# Patient Record
Sex: MALE | Race: WHITE | NOT HISPANIC OR LATINO | Employment: UNEMPLOYED | URBAN - METROPOLITAN AREA
[De-identification: names, ages, dates, MRNs, and addresses within clinical notes are randomized per-mention and may not be internally consistent; named-entity substitution may affect disease eponyms.]

---

## 2021-08-11 ENCOUNTER — HISTORICAL (OUTPATIENT)
Dept: LAB | Facility: HOSPITAL | Age: 61
End: 2021-08-11

## 2021-08-11 LAB
ALBUMIN SERPL-MCNC: 3.4 GM/DL (ref 3.5–5)
ALP SERPL-CCNC: 79 UNIT/L (ref 40–150)
ALT SERPL-CCNC: 9 UNIT/L (ref 0–55)
AST SERPL-CCNC: 14 UNIT/L (ref 5–34)
BILIRUB SERPL-MCNC: 0.4 MG/DL
BILIRUBIN DIRECT+TOT PNL SERPL-MCNC: 0.2 MG/DL (ref 0–0.5)
BILIRUBIN DIRECT+TOT PNL SERPL-MCNC: 0.2 MG/DL (ref 0–0.8)
BUN SERPL-MCNC: 15.5 MG/DL (ref 8.4–25.7)
CALCIUM SERPL-MCNC: 9.4 MG/DL (ref 8.4–10.2)
CHLORIDE SERPL-SCNC: 102 MMOL/L (ref 98–107)
CHOLEST SERPL-MCNC: 189 MG/DL
CHOLEST/HDLC SERPL: 4 {RATIO} (ref 0–5)
CO2 SERPL-SCNC: 30 MMOL/L (ref 22–29)
CREAT SERPL-MCNC: 0.85 MG/DL (ref 0.73–1.18)
CREAT/UREA NIT SERPL: 18
ERYTHROCYTE [DISTWIDTH] IN BLOOD BY AUTOMATED COUNT: 15.4 % (ref 11.5–17)
FT4I SERPL CALC-MCNC: 2.6 (ref 2.6–3.6)
GLUCOSE SERPL-MCNC: 111 MG/DL (ref 74–100)
HCT VFR BLD AUTO: 44.5 % (ref 42–52)
HDLC SERPL-MCNC: 48 MG/DL (ref 35–60)
HGB BLD-MCNC: 13 GM/DL (ref 14–18)
LDLC SERPL CALC-MCNC: 119 MG/DL (ref 50–140)
MCH RBC QN AUTO: 22 PG (ref 27–31)
MCHC RBC AUTO-ENTMCNC: 29.2 GM/DL (ref 33–36)
MCV RBC AUTO: 75.2 FL (ref 80–94)
PLATELET # BLD AUTO: 244 X10(3)/MCL (ref 130–400)
PMV BLD AUTO: 8.9 FL (ref 9.4–12.4)
POTASSIUM SERPL-SCNC: 4.6 MMOL/L (ref 3.5–5.1)
PROT SERPL-MCNC: 7.2 GM/DL (ref 6.4–8.3)
PSA SERPL-MCNC: 0.11 NG/ML
RBC # BLD AUTO: 5.92 X10(6)/MCL (ref 4.7–6.1)
SODIUM SERPL-SCNC: 140 MMOL/L (ref 136–145)
T3RU NFR SERPL: 31.9 % (ref 31–39)
T4 SERPL-MCNC: 8.16 UG/DL (ref 4.87–11.72)
TRIGL SERPL-MCNC: 108 MG/DL (ref 34–140)
TSH SERPL-ACNC: 1.94 UIU/ML (ref 0.35–4.94)
VLDLC SERPL CALC-MCNC: 22 MG/DL
WBC # SPEC AUTO: 7.5 X10(3)/MCL (ref 4.5–11.5)

## 2022-10-21 ENCOUNTER — LAB VISIT (OUTPATIENT)
Dept: LAB | Facility: HOSPITAL | Age: 62
End: 2022-10-21
Attending: FAMILY MEDICINE
Payer: MEDICAID

## 2022-10-21 DIAGNOSIS — Z00.00 ROUTINE GENERAL MEDICAL EXAMINATION AT A HEALTH CARE FACILITY: Primary | ICD-10-CM

## 2022-10-21 DIAGNOSIS — I10 ESSENTIAL HYPERTENSION, MALIGNANT: ICD-10-CM

## 2022-10-21 DIAGNOSIS — J96.12 CHRONIC RESPIRATORY ACIDOSIS: ICD-10-CM

## 2022-10-21 LAB
ALBUMIN SERPL-MCNC: 3.2 GM/DL (ref 3.4–4.8)
ALP SERPL-CCNC: 85 UNIT/L (ref 40–150)
ALT SERPL-CCNC: 8 UNIT/L (ref 0–55)
ANION GAP SERPL CALC-SCNC: 7 MEQ/L
AST SERPL-CCNC: 12 UNIT/L (ref 5–34)
BASOPHILS # BLD AUTO: 0.04 X10(3)/MCL (ref 0–0.2)
BASOPHILS NFR BLD AUTO: 0.5 %
BILIRUBIN DIRECT+TOT PNL SERPL-MCNC: 0.2 MG/DL (ref 0–0.5)
BILIRUBIN DIRECT+TOT PNL SERPL-MCNC: 0.4 MG/DL (ref 0–0.8)
BILIRUBIN DIRECT+TOT PNL SERPL-MCNC: 0.6 MG/DL
BUN SERPL-MCNC: 12.6 MG/DL (ref 8.4–25.7)
CALCIUM SERPL-MCNC: 9.3 MG/DL (ref 8.8–10)
CHLORIDE SERPL-SCNC: 101 MMOL/L (ref 98–107)
CHOLEST SERPL-MCNC: 190 MG/DL
CHOLEST/HDLC SERPL: 4 {RATIO} (ref 0–5)
CO2 SERPL-SCNC: 30 MMOL/L (ref 23–31)
CREAT SERPL-MCNC: 0.73 MG/DL (ref 0.73–1.18)
CREAT/UREA NIT SERPL: 17
EOSINOPHIL # BLD AUTO: 0.1 X10(3)/MCL (ref 0–0.9)
EOSINOPHIL NFR BLD AUTO: 1.3 %
ERYTHROCYTE [DISTWIDTH] IN BLOOD BY AUTOMATED COUNT: 15.1 % (ref 11.5–17)
FT4I SERPL CALC-MCNC: 2.95 (ref 2.6–3.6)
GFR SERPLBLD CREATININE-BSD FMLA CKD-EPI: >60 MLS/MIN/1.73/M2
GLUCOSE SERPL-MCNC: 111 MG/DL (ref 82–115)
HCT VFR BLD AUTO: 43.4 % (ref 42–52)
HDLC SERPL-MCNC: 47 MG/DL (ref 35–60)
HGB BLD-MCNC: 12.7 GM/DL (ref 14–18)
IMM GRANULOCYTES # BLD AUTO: 0 X10(3)/MCL (ref 0–0.04)
IMM GRANULOCYTES NFR BLD AUTO: 0 %
LDLC SERPL CALC-MCNC: 122 MG/DL (ref 50–140)
LYMPHOCYTES # BLD AUTO: 1.52 X10(3)/MCL (ref 0.6–4.6)
LYMPHOCYTES NFR BLD AUTO: 19.3 %
MCH RBC QN AUTO: 22.2 PG (ref 27–31)
MCHC RBC AUTO-ENTMCNC: 29.3 MG/DL (ref 33–36)
MCV RBC AUTO: 75.9 FL (ref 80–94)
MONOCYTES # BLD AUTO: 0.7 X10(3)/MCL (ref 0.1–1.3)
MONOCYTES NFR BLD AUTO: 8.9 %
NEUTROPHILS # BLD AUTO: 5.5 X10(3)/MCL (ref 2.1–9.2)
NEUTROPHILS NFR BLD AUTO: 70 %
PLATELET # BLD AUTO: 256 X10(3)/MCL (ref 130–400)
PMV BLD AUTO: 8.9 FL (ref 7.4–10.4)
POTASSIUM SERPL-SCNC: 4.5 MMOL/L (ref 3.5–5.1)
PROT SERPL-MCNC: 7.1 GM/DL (ref 5.8–7.6)
PSA SERPL-MCNC: 0.17 NG/ML
RBC # BLD AUTO: 5.72 X10(6)/MCL (ref 4.7–6.1)
SODIUM SERPL-SCNC: 138 MMOL/L (ref 136–145)
T3RU NFR SERPL: 36.29 % (ref 31–39)
T4 SERPL-MCNC: 8.14 UG/DL (ref 4.87–11.72)
TRIGL SERPL-MCNC: 105 MG/DL (ref 34–140)
TSH SERPL-ACNC: 1.76 UIU/ML (ref 0.35–4.94)
VLDLC SERPL CALC-MCNC: 21 MG/DL
WBC # SPEC AUTO: 7.9 X10(3)/MCL (ref 4.5–11.5)

## 2022-10-21 PROCEDURE — 84436 ASSAY OF TOTAL THYROXINE: CPT

## 2022-10-21 PROCEDURE — 80061 LIPID PANEL: CPT

## 2022-10-21 PROCEDURE — 85025 COMPLETE CBC W/AUTO DIFF WBC: CPT

## 2022-10-21 PROCEDURE — 84153 ASSAY OF PSA TOTAL: CPT

## 2022-10-21 PROCEDURE — 36415 COLL VENOUS BLD VENIPUNCTURE: CPT

## 2022-10-21 PROCEDURE — 80076 HEPATIC FUNCTION PANEL: CPT

## 2022-10-21 PROCEDURE — 84443 ASSAY THYROID STIM HORMONE: CPT

## 2022-10-21 PROCEDURE — 84479 ASSAY OF THYROID (T3 OR T4): CPT

## 2022-10-21 PROCEDURE — 80048 BASIC METABOLIC PNL TOTAL CA: CPT

## 2024-03-29 ENCOUNTER — HOSPITAL ENCOUNTER (EMERGENCY)
Facility: HOSPITAL | Age: 64
Discharge: HOME | End: 2024-03-29
Attending: FAMILY MEDICINE
Payer: MEDICAID

## 2024-03-29 VITALS
BODY MASS INDEX: 40.43 KG/M2 | OXYGEN SATURATION: 96 % | HEIGHT: 74 IN | WEIGHT: 315 LBS | HEART RATE: 20 BPM | RESPIRATION RATE: 20 BRPM | SYSTOLIC BLOOD PRESSURE: 159 MMHG | TEMPERATURE: 98 F | DIASTOLIC BLOOD PRESSURE: 78 MMHG

## 2024-03-29 DIAGNOSIS — R21 RASH: Primary | ICD-10-CM

## 2024-03-29 PROCEDURE — 25000003 PHARM REV CODE 250: Performed by: FAMILY MEDICINE

## 2024-03-29 PROCEDURE — 99283 EMERGENCY DEPT VISIT LOW MDM: CPT

## 2024-03-29 RX ORDER — MUPIROCIN 20 MG/G
1 OINTMENT TOPICAL
Status: COMPLETED | OUTPATIENT
Start: 2024-03-29 | End: 2024-03-29

## 2024-03-29 RX ORDER — NYSTATIN 100000 [USP'U]/G
POWDER TOPICAL 2 TIMES DAILY
Qty: 60 G | Refills: 0 | Status: SHIPPED | OUTPATIENT
Start: 2024-03-29

## 2024-03-29 RX ADMIN — MUPIROCIN 1 TUBE: 20 OINTMENT TOPICAL at 02:03

## 2024-03-29 NOTE — DISCHARGE INSTRUCTIONS
Stopped the steroid cream.  Follow up with wound care next week.  Use medicines as instructed.  Recommend weight loss.

## 2024-03-29 NOTE — ED PROVIDER NOTES
Encounter Date: 3/29/2024       History     Chief Complaint   Patient presents with    Rash     Pt reports to rash to lower abd area . Pt states it is raw and bleeds at time after showers.      64-year-old morbidly obese white male presents with a chronic rash to his lower abdomen says it is rash has been there over a year with skin breakdown says they have been using steroid cream for over a year and would like to be get it evaluated vital signs are stable on exam has a morbid obese male large pannus lifting up the belly he has some skin tears in his areas of excoriation no signs of cellulitis very thin skin probably from overuse of the steroid cream instructed patient to stop using the steroid cream keep the area dry and clean use some ointment until he can follow up with the wound care center next week recommend weight loss possible meet with a surgeon for a stomach banding or medications to reduce the size of his abdomen I am afraid this is to be a chronic problem until he loses some weight explained all this to patient he understands we will follow up with wound care next week        Review of patient's allergies indicates:  No Known Allergies  History reviewed. No pertinent past medical history.  History reviewed. No pertinent surgical history.  History reviewed. No pertinent family history.     Review of Systems   Constitutional:  Negative for fever.   HENT:  Negative for sore throat.    Respiratory:  Negative for shortness of breath.    Cardiovascular:  Negative for chest pain.   Gastrointestinal:  Negative for nausea.   Genitourinary:  Negative for dysuria.   Musculoskeletal:  Negative for back pain.   Skin:  Positive for rash and wound.   Neurological:  Negative for weakness.   Hematological:  Does not bruise/bleed easily.   All other systems reviewed and are negative.      Physical Exam     Initial Vitals [03/29/24 1401]   BP Pulse Resp Temp SpO2   (!) 159/78 (!) 20 20 97.8 °F (36.6 °C) 96 %      MAP        --         Physical Exam    Nursing note and vitals reviewed.  Constitutional: He appears well-developed and well-nourished. He is active.   HENT:   Head: Normocephalic and atraumatic.   Eyes: Conjunctivae, EOM and lids are normal. Pupils are equal, round, and reactive to light.   Neck: Trachea normal and phonation normal. Neck supple. No thyroid mass present.   Normal range of motion.  Cardiovascular:  Normal rate, regular rhythm, normal heart sounds and normal pulses.           Pulmonary/Chest: Breath sounds normal.   Abdominal: Abdomen is soft. Bowel sounds are normal.   Musculoskeletal:      Cervical back: Normal range of motion and neck supple.     Neurological: He is alert and oriented to person, place, and time. He has normal strength and normal reflexes.   Skin: Skin is warm and intact. Rash noted. There is erythema.   Skin tear   Psychiatric: He has a normal mood and affect. His speech is normal and behavior is normal. Judgment and thought content normal. Cognition and memory are normal.         ED Course   Procedures  Labs Reviewed - No data to display       Imaging Results    None          Medications   mupirocin 2 % ointment 1 Tube (1 Tube Topical (Top) Given 3/29/24 1416)     Medical Decision Making  64-year-old morbidly obese white male presents with a chronic rash to his lower abdomen says it is rash has been there over a year with skin breakdown says they have been using steroid cream for over a year and would like to be get it evaluated vital signs are stable on exam has a morbid obese male large pannus lifting up the belly he has some skin tears in his areas of excoriation no signs of cellulitis very thin skin probably from overuse of the steroid cream instructed patient to stop using the steroid cream keep the area dry and clean use some ointment until he can follow up with the wound care center next week recommend weight loss possible meet with a surgeon for a stomach banding or medications to  reduce the size of his abdomen I am afraid this is to be a chronic problem until he loses some weight explained all this to patient he understands we will follow up with wound care next week          Risk  Prescription drug management.  Risk Details: Differential diagnosis bacterial infection versus fungal infection skin thinning from overuse of steroids skin tear                                      Clinical Impression:  Final diagnoses:  [R21] Rash (Primary)          ED Disposition Condition    Discharge Stable          ED Prescriptions       Medication Sig Dispense Start Date End Date Auth. Provider    nystatin (MYCOSTATIN) powder Apply topically 2 (two) times daily. 60 g 3/29/2024 -- Zeeshan Wynne MD          Follow-up Information       Follow up With Specialties Details Why Contact Info    Jake Moralez Jr., MD Family Medicine In 1 week  [address]  [facility] [address]  [phone]               Zeeshan Wynne MD  03/29/24 1419       Zeeshan Wynne MD  03/29/24 1419       Zeeshan Wynne MD  03/29/24 142

## 2024-03-29 NOTE — ED NOTES
Wound care clinic at [facility] called for referral, no answer but message was left. Informed of pt's current wound and information provided for follow-up

## 2024-04-04 ENCOUNTER — HOSPITAL ENCOUNTER (OUTPATIENT)
Dept: WOUND CARE | Facility: HOSPITAL | Age: 64
Discharge: HOME | End: 2024-04-04
Attending: FAMILY MEDICINE
Payer: COMMERCIAL

## 2024-04-04 VITALS
SYSTOLIC BLOOD PRESSURE: 153 MMHG | HEART RATE: 82 BPM | DIASTOLIC BLOOD PRESSURE: 80 MMHG | RESPIRATION RATE: 18 BRPM | TEMPERATURE: 99 F | OXYGEN SATURATION: 94 %

## 2024-04-04 DIAGNOSIS — B37.2 CANDIDIASIS, INTERTRIGO: Primary | ICD-10-CM

## 2024-04-04 DIAGNOSIS — L89.90 PRESSURE ULCERS OF SKIN OF MULTIPLE TOPOGRAPHIC SITES: ICD-10-CM

## 2024-04-04 DIAGNOSIS — L03.311 ABDOMINAL WALL CELLULITIS: ICD-10-CM

## 2024-04-04 DIAGNOSIS — E65 ABDOMINAL PANNUS: ICD-10-CM

## 2024-04-04 PROCEDURE — 99203 OFFICE O/P NEW LOW 30 MIN: CPT | Mod: ,,, | Performed by: FAMILY MEDICINE

## 2024-04-04 PROCEDURE — 99204 OFFICE O/P NEW MOD 45 MIN: CPT

## 2024-04-04 PROCEDURE — 87070 CULTURE OTHR SPECIMN AEROBIC: CPT

## 2024-04-04 PROCEDURE — 27000999 HC MEDICAL RECORD PHOTO DOCUMENTATION

## 2024-04-04 RX ORDER — METOPROLOL SUCCINATE 25 MG/1
25 TABLET, EXTENDED RELEASE ORAL DAILY
COMMUNITY

## 2024-04-04 RX ORDER — MELOXICAM 15 MG/1
15 TABLET ORAL
COMMUNITY
Start: 2024-03-11

## 2024-04-04 RX ORDER — TERBINAFINE HYDROCHLORIDE 250 MG/1
250 TABLET ORAL DAILY
Qty: 30 TABLET | Refills: 0 | Status: SHIPPED | OUTPATIENT
Start: 2024-04-04 | End: 2024-05-04

## 2024-04-04 RX ORDER — FINASTERIDE 5 MG/1
5 TABLET, FILM COATED ORAL DAILY
COMMUNITY

## 2024-04-04 RX ORDER — SILVER NITRATE 38.21; 12.74 MG/1; MG/1
STICK TOPICAL
Status: DISCONTINUED
Start: 2024-04-04 | End: 2024-04-05 | Stop reason: HOSPADM

## 2024-04-04 RX ORDER — TRIAMCINOLONE ACETONIDE 1 MG/G
CREAM TOPICAL 3 TIMES DAILY
COMMUNITY
Start: 2024-03-25

## 2024-04-04 RX ORDER — AMLODIPINE BESYLATE 10 MG/1
10 TABLET ORAL DAILY
COMMUNITY

## 2024-04-04 RX ORDER — MUPIROCIN 20 MG/G
OINTMENT TOPICAL 3 TIMES DAILY
COMMUNITY

## 2024-04-04 RX ORDER — SULFAMETHOXAZOLE AND TRIMETHOPRIM 800; 160 MG/1; MG/1
1 TABLET ORAL 2 TIMES DAILY
Qty: 30 TABLET | Refills: 0 | Status: SHIPPED | OUTPATIENT
Start: 2024-04-04 | End: 2024-04-19

## 2024-04-04 RX ORDER — FUROSEMIDE 20 MG/1
20 TABLET ORAL 2 TIMES DAILY
COMMUNITY

## 2024-04-04 RX ORDER — TAMSULOSIN HYDROCHLORIDE 0.4 MG/1
0.4 CAPSULE ORAL DAILY
COMMUNITY

## 2024-04-04 RX ORDER — KETOCONAZOLE 20 MG/G
CREAM TOPICAL DAILY
COMMUNITY

## 2024-04-04 NOTE — PROGRESS NOTES
[facility]      Subjective:     Patient seen in clinic today.  Dressing changed as ordered.      Assessment:          No diagnosis found.      Plan:   [unfilled]        No orders of the defined types were placed in this encounter.

## 2024-04-04 NOTE — PROGRESS NOTES
Subjective:       Patient ID: Tracy Hendrix is a 64 y.o. male.    Chief Complaint: Wound Consult (New patient.   Referred for Abdominal fold skin rash/irritation, ulcers to abdominal folds.  Reports he has had recurring issues for past several years and has been using steroid cream to area.   Seen at [facility] ED on 3/29/24.   Discontinued use of triamcinolone.  Currently using bactroban ointment and nystatin powder topically to area.  Here for evaluation and treatment with md. )    HPI  Review of Systems      Objective:      Temp:  [98.9 °F (37.2 °C)]   Pulse:  [82]   Resp:  [18]   BP: (153)/(80)   SpO2:  [94 %]   Physical Exam       Rash 04/04/24 1300 lower Abdomen (Active)   04/04/24 1300   Present Prior to Hospital Arrival?: Yes   Side:    Orientation: lower   Location: Abdomen   Type:    Additional Comments:    Removal Indications:    Distribution generalized 04/04/24 1441   Characteristics burning;pruritus/itching;draining/oozing;redness/erythema;edema;pain/discomfort 04/04/24 1441   Color deep red;pink;purple;red;tan 04/04/24 1441   Rash Care cleansed with;soap and water;antifungal applied;barrier applied;other (see comments) 04/04/24 1441         Assessment:     Pt examined and notes review above. The pt went to ER in  and he was referred here for evaluation and tx. He said he had problems for some time with infections and redness of the skin folds. The patient is severe obese and he have an abd pannus and redness of the abdominal area 2nd to cellulitis. The pt have multiple ulcers in the folds in the left side of the folds. Some areas of bruising in the adb folds in the left side. Intertrigo with candidiasis infection in the perineal area and abd folds. The pt said he doesn't have diabetes but his mom have a hx of DM. I recommend to do some blood work CBC/CMP/HGA1c/ culture of the wounds. The ulcers in the pannus area is probably related to pressure of the abd folds rubbing. Plans is to  "start Bactrim DS x 15 days for the cellulitis and lamisil 250mg qd x 30 days.Apply thin mixture of antifungal cream and Zguard BID. RTC in 1 week                                  ICD-10-CM ICD-9-CM   1. Candidiasis, intertrigo  B37.2 112.3   2. Pressure ulcers of skin of multiple topographic sites  L89.90 707.00     707.20   3. Abdominal pannus  E65 278.1   4. Abdominal wall cellulitis  L03.311 682.2         Plan:   Tissue pathology and/or culture taken:  [x] Yes [] No   Sharp debridement performed:   [] Yes [x] No   Labs ordered this visit:   [x] Yes [] No   Imaging ordered this visit:   [] Yes [x] No           Orders Placed This Encounter   Procedures    Wound Culture    CBC Auto Differential     Standing Status:   Future     Standing Expiration Date:   7/3/2025    Hemoglobin A1C     Standing Status:   Future     Standing Expiration Date:   7/3/2025    Comprehensive Metabolic Panel     Standing Status:   Future     Standing Expiration Date:   7/3/2025    Change dressing     Shower using gentle soap and water (Remington's and Remington's shampoo)  Dry all skin folds very well.      Apply thin mixture of Antifungal ointment mixed with Z guard paste to skin folds  Use soft absorbant dry sheets to folds ( may use soft disposable wash cloths, old cotton soft sheets or tshirt strips, chux pads etc)    Goal is to make sure to absorb excess moisture and have "sheets rubbing together / not skin rubbing together)    Apply mixture twice daily     antibiotic and antifungal oral medication and take as prescribed, antibiotics may be changed if needed when final culture results returned    Go to [facility] for outpatient labs        Follow up in about 1 week (around 4/11/2024) for md visit .                  "

## 2024-04-04 NOTE — PATIENT INSTRUCTIONS
"Shower using gentle soap and water (Remington's and Remington's shampoo)  Dry all skin folds very well.      Apply thin mixture of Antifungal ointment mixed with Z guard paste to skin folds  Use soft absorbant dry sheets to folds ( may use soft disposable wash cloths, old cotton soft sheets or tshirt strips, chux pads etc)    Goal is to make sure to absorb excess moisture and have "sheets rubbing together / not skin rubbing together)    Apply mixture twice daily     antibiotic and antifungal oral medication and take as prescribed, antibiotics may be changed if needed when final culture results returned    Go to [facility] for outpatient labs      "

## 2024-04-07 LAB — BACTERIA WND CULT: ABNORMAL

## 2024-04-08 ENCOUNTER — LAB VISIT (OUTPATIENT)
Dept: LAB | Facility: HOSPITAL | Age: 64
End: 2024-04-08
Attending: FAMILY MEDICINE
Payer: COMMERCIAL

## 2024-04-08 DIAGNOSIS — B37.2 CANDIDIASIS, INTERTRIGO: ICD-10-CM

## 2024-04-08 DIAGNOSIS — L03.311 ABDOMINAL WALL CELLULITIS: ICD-10-CM

## 2024-04-08 LAB
ALBUMIN SERPL-MCNC: 3.4 G/DL (ref 3.4–4.8)
ALBUMIN/GLOB SERPL: 0.9 RATIO (ref 1.1–2)
ALP SERPL-CCNC: 80 UNIT/L (ref 40–150)
ALT SERPL-CCNC: 10 UNIT/L (ref 0–55)
AST SERPL-CCNC: 17 UNIT/L (ref 5–34)
BASOPHILS # BLD AUTO: 0.04 X10(3)/MCL
BASOPHILS NFR BLD AUTO: 0.5 %
BILIRUB SERPL-MCNC: 0.4 MG/DL
BUN SERPL-MCNC: 15.7 MG/DL (ref 8.4–25.7)
CALCIUM SERPL-MCNC: 9.4 MG/DL (ref 8.8–10)
CHLORIDE SERPL-SCNC: 106 MMOL/L (ref 98–107)
CO2 SERPL-SCNC: 25 MMOL/L (ref 23–31)
CREAT SERPL-MCNC: 1.04 MG/DL (ref 0.73–1.18)
EOSINOPHIL # BLD AUTO: 0.37 X10(3)/MCL (ref 0–0.9)
EOSINOPHIL NFR BLD AUTO: 4.5 %
ERYTHROCYTE [DISTWIDTH] IN BLOOD BY AUTOMATED COUNT: 15.9 % (ref 11.5–17)
EST. AVERAGE GLUCOSE BLD GHB EST-MCNC: 131.2 MG/DL
GFR SERPLBLD CREATININE-BSD FMLA CKD-EPI: >60 MLS/MIN/1.73/M2
GLOBULIN SER-MCNC: 3.6 GM/DL (ref 2.4–3.5)
GLUCOSE SERPL-MCNC: 137 MG/DL (ref 82–115)
HBA1C MFR BLD: 6.2 %
HCT VFR BLD AUTO: 41.9 % (ref 42–52)
HGB BLD-MCNC: 12.5 G/DL (ref 14–18)
IMM GRANULOCYTES # BLD AUTO: 0.03 X10(3)/MCL (ref 0–0.04)
IMM GRANULOCYTES NFR BLD AUTO: 0.4 %
LYMPHOCYTES # BLD AUTO: 2.46 X10(3)/MCL (ref 0.6–4.6)
LYMPHOCYTES NFR BLD AUTO: 29.6 %
MCH RBC QN AUTO: 22.6 PG (ref 27–31)
MCHC RBC AUTO-ENTMCNC: 29.8 G/DL (ref 33–36)
MCV RBC AUTO: 75.6 FL (ref 80–94)
MONOCYTES # BLD AUTO: 0.68 X10(3)/MCL (ref 0.1–1.3)
MONOCYTES NFR BLD AUTO: 8.2 %
NEUTROPHILS # BLD AUTO: 4.72 X10(3)/MCL (ref 2.1–9.2)
NEUTROPHILS NFR BLD AUTO: 56.8 %
PLATELET # BLD AUTO: 300 X10(3)/MCL (ref 130–400)
PMV BLD AUTO: 8.9 FL (ref 7.4–10.4)
POTASSIUM SERPL-SCNC: 4.7 MMOL/L (ref 3.5–5.1)
PROT SERPL-MCNC: 7 GM/DL (ref 5.8–7.6)
RBC # BLD AUTO: 5.54 X10(6)/MCL (ref 4.7–6.1)
SODIUM SERPL-SCNC: 140 MMOL/L (ref 136–145)
WBC # SPEC AUTO: 8.3 X10(3)/MCL (ref 4.5–11.5)

## 2024-04-08 PROCEDURE — 83036 HEMOGLOBIN GLYCOSYLATED A1C: CPT

## 2024-04-08 PROCEDURE — 85025 COMPLETE CBC W/AUTO DIFF WBC: CPT

## 2024-04-08 PROCEDURE — 36415 COLL VENOUS BLD VENIPUNCTURE: CPT

## 2024-04-08 PROCEDURE — 80053 COMPREHEN METABOLIC PANEL: CPT

## 2024-04-10 NOTE — PATIENT INSTRUCTIONS
"Shower using gentle soap and water (Remington's and Remington's shampoo)  Dry all skin folds very well.      Apply thin mixture of Antifungal ointment mixed with Z guard paste to skin folds  Use soft absorbant dry sheets to folds ( may use soft disposable wash cloths, old cotton soft sheets or tshirt strips, chux pads etc)    Goal is to make sure to absorb excess moisture and have "sheets rubbing together / not skin rubbing together)     Apply mixture twice daily  Continue oral antibiotics and antifungal oral medications  as prescribed,     Follow up in 2 weeks in wound care center          "

## 2024-04-11 ENCOUNTER — HOSPITAL ENCOUNTER (OUTPATIENT)
Dept: WOUND CARE | Facility: HOSPITAL | Age: 64
Discharge: HOME | End: 2024-04-11
Attending: FAMILY MEDICINE
Payer: COMMERCIAL

## 2024-04-11 VITALS
TEMPERATURE: 99 F | OXYGEN SATURATION: 96 % | RESPIRATION RATE: 20 BRPM | HEART RATE: 83 BPM | SYSTOLIC BLOOD PRESSURE: 152 MMHG | DIASTOLIC BLOOD PRESSURE: 75 MMHG

## 2024-04-11 DIAGNOSIS — B37.2 CANDIDIASIS, INTERTRIGO: Primary | ICD-10-CM

## 2024-04-11 PROCEDURE — 99212 OFFICE O/P EST SF 10 MIN: CPT | Mod: ,,, | Performed by: FAMILY MEDICINE

## 2024-04-11 PROCEDURE — 27000999 HC MEDICAL RECORD PHOTO DOCUMENTATION

## 2024-04-11 PROCEDURE — 99213 OFFICE O/P EST LOW 20 MIN: CPT

## 2024-04-11 NOTE — PROGRESS NOTES
"   Subjective:       Patient ID: Tracy Hendrix is a 64 y.o. male.    Chief Complaint: Wound Care (Abdominal Fold/ groin fold intertrigo and eroded skin.   Reports taking medication as prescribed.   Here for follow up with md. )    HPI  Review of Systems      Objective:      Temp:  [98.6 °F (37 °C)]   Pulse:  [83]   Resp:  [20]   BP: (152)/(75)   SpO2:  [96 %]   Physical Exam       Rash 04/04/24 1300 lower Abdomen (Active)   04/04/24 1300   Present Prior to Hospital Arrival?: Yes   Side:    Orientation: lower   Location: Abdomen   Type:    Additional Comments:    Removal Indications:    Distribution regional 04/11/24 1549   Characteristics redness/erythema 04/11/24 1549   Color red;pink 04/11/24 1549   Rash Care cleansed with;water;barrier applied;antifungal applied 04/11/24 1549         Assessment:     Pt examined and notes review above. Skin is better, decrease of drainage. Apply thin mixture of antifungal oitment mixed with Z guard paste to skin folds BID. Continue abx and lamisil until finish and keep area dry. We went over his blood work and he is anemic and his HGBA1C WAS 6.2 fasting. I recommeded for him to go see his PCP for further evaluation of his blood work. RTC in 2 weeks.    ICD-10-CM ICD-9-CM   1. Candidiasis, intertrigo  B37.2 112.3         Plan:   Tissue pathology and/or culture taken:  [] Yes [x] No   Sharp debridement performed:   [] Yes [x] No   Labs ordered this visit:   [] Yes [x] No   Imaging ordered this visit:   [] Yes [x] No           Orders Placed This Encounter   Procedures    Change dressing     Shower using gentle soap and water (Remington's and Remington's shampoo)  Dry all skin folds very well.      Apply thin mixture of Antifungal ointment mixed with Z guard paste to skin folds  Use soft absorbant dry sheets to folds ( may use soft disposable wash cloths, old cotton soft sheets or tshirt strips, chux pads etc)    Goal is to make sure to absorb excess moisture and have "sheets rubbing " together / not skin rubbing together)     Apply mixture twice daily  Continue oral antibiotics and antifungal oral medications  as prescribed,      Follow up in 2 weeks in wound care center        Follow up in about 2 weeks (around 4/25/2024) for md visit .

## 2024-04-23 NOTE — PATIENT INSTRUCTIONS
"Shower using gentle soap and water (Remington's and Remington's shampoo)  Dry all skin folds very well.      Apply thin mixture of Antifungal ointment mixed with Z guard paste to skin folds  Use soft absorbant dry sheets to folds ( may use soft disposable wash cloths, old cotton soft sheets or tshirt strips, chux pads etc)    Goal is to make sure to absorb excess moisture and have "sheets rubbing together / not skin rubbing together)     Apply mixture twice daily       Discharged from wound care center, call for appt if needed     "

## 2024-04-25 ENCOUNTER — HOSPITAL ENCOUNTER (OUTPATIENT)
Dept: WOUND CARE | Facility: HOSPITAL | Age: 64
Discharge: HOME | End: 2024-04-25
Attending: FAMILY MEDICINE
Payer: COMMERCIAL

## 2024-04-25 VITALS
TEMPERATURE: 98 F | SYSTOLIC BLOOD PRESSURE: 125 MMHG | OXYGEN SATURATION: 95 % | HEART RATE: 98 BPM | RESPIRATION RATE: 20 BRPM | DIASTOLIC BLOOD PRESSURE: 69 MMHG

## 2024-04-25 DIAGNOSIS — B37.2 CANDIDIASIS, INTERTRIGO: Primary | ICD-10-CM

## 2024-04-25 PROCEDURE — 27000999 HC MEDICAL RECORD PHOTO DOCUMENTATION

## 2024-04-25 PROCEDURE — 99212 OFFICE O/P EST SF 10 MIN: CPT | Mod: ,,, | Performed by: FAMILY MEDICINE

## 2024-04-25 PROCEDURE — 99213 OFFICE O/P EST LOW 20 MIN: CPT

## 2024-04-25 NOTE — PROGRESS NOTES
"   Subjective:       Patient ID: Tracy Hendrix is a 64 y.o. male.    Chief Complaint: Intertrigo (Abdominal Fold/ groin fold intertrigo.  Reports taking medication as prescribed.   Here for follow up with MD. / /)    HPI  Review of Systems      Objective:      Temp:  [98.4 °F (36.9 °C)]   Pulse:  [98]   Resp:  [20]   BP: (125)/(69)   SpO2:  [95 %]   Physical Exam       Rash 04/04/24 1300 lower Abdomen (Active)   04/04/24 1300   Present Prior to Hospital Arrival?: Yes   Side:    Orientation: lower   Location: Abdomen   Type:    Additional Comments:    Removal Indications:    Color color consistent w/skin 04/25/24 1547   Rash Care cleansed with;antifungal applied;barrier applied 04/25/24 1547         Assessment:     Pt examined and notes review above. Rash is healed. Contoinue same care and RTC as needed. Continue Zguard and antifungal mix and apply to skin folds.    ICD-10-CM ICD-9-CM   1. Candidiasis, intertrigo  B37.2 112.3         Plan:   Tissue pathology and/or culture taken:  [] Yes [x] No   Sharp debridement performed:   [] Yes [x] No   Labs ordered this visit:   [] Yes [x] No   Imaging ordered this visit:   [] Yes [x] No           Orders Placed This Encounter   Procedures    Change dressing     Shower using gentle soap and water (Remington's and Remington's shampoo)  Dry all skin folds very well.      Apply thin mixture of Antifungal ointment mixed with Z guard paste to skin folds  Use soft absorbant dry sheets to folds ( may use soft disposable wash cloths, old cotton soft sheets or tshirt strips, chux pads etc)    Goal is to make sure to absorb excess moisture and have "sheets rubbing together / not skin rubbing together)     Apply mixture twice daily       Discharged from wound care center, call for appt if needed        Follow up in about 2 weeks (around 5/9/2024), or if symptoms worsen or fail to improve, for md visit .                  "

## 2024-07-29 ENCOUNTER — HOSPITAL ENCOUNTER (OUTPATIENT)
Dept: RADIOLOGY | Facility: HOSPITAL | Age: 64
Discharge: HOME | End: 2024-07-29
Attending: FAMILY MEDICINE
Payer: COMMERCIAL

## 2024-07-29 DIAGNOSIS — M54.30 SCIATICA: Primary | ICD-10-CM

## 2024-07-29 DIAGNOSIS — M54.30 SCIATICA: ICD-10-CM

## 2024-07-29 PROCEDURE — 72100 X-RAY EXAM L-S SPINE 2/3 VWS: CPT | Mod: TC

## 2024-07-31 ENCOUNTER — APPOINTMENT (OUTPATIENT)
Dept: LAB | Facility: HOSPITAL | Age: 64
End: 2024-07-31
Attending: INTERNAL MEDICINE
Payer: COMMERCIAL

## 2024-07-31 DIAGNOSIS — R39.15 URGENCY OF URINATION: Primary | ICD-10-CM

## 2024-07-31 LAB
BACTERIA #/AREA URNS AUTO: ABNORMAL /HPF
BILIRUB UR QL STRIP.AUTO: NEGATIVE
CLARITY UR: ABNORMAL
COLOR UR AUTO: YELLOW
GLUCOSE UR QL STRIP: NEGATIVE
HGB UR QL STRIP: ABNORMAL
KETONES UR QL STRIP: NEGATIVE
LEUKOCYTE ESTERASE UR QL STRIP: ABNORMAL
NITRITE UR QL STRIP: POSITIVE
PH UR STRIP: 7 [PH]
PROT UR QL STRIP: 100
RBC #/AREA URNS AUTO: ABNORMAL /HPF
SP GR UR STRIP.AUTO: 1.02 (ref 1–1.03)
SQUAMOUS #/AREA URNS AUTO: ABNORMAL /HPF
UROBILINOGEN UR STRIP-ACNC: 0.2
WBC #/AREA URNS AUTO: ABNORMAL /HPF

## 2024-07-31 PROCEDURE — 87077 CULTURE AEROBIC IDENTIFY: CPT

## 2024-07-31 PROCEDURE — 87086 URINE CULTURE/COLONY COUNT: CPT

## 2024-07-31 PROCEDURE — 87186 SC STD MICRODIL/AGAR DIL: CPT | Mod: 91

## 2024-07-31 PROCEDURE — 81001 URINALYSIS AUTO W/SCOPE: CPT

## 2024-07-31 PROCEDURE — 81003 URINALYSIS AUTO W/O SCOPE: CPT

## 2024-08-04 LAB
BACTERIA UR CULT: ABNORMAL
BACTERIA UR CULT: ABNORMAL

## 2025-02-27 ENCOUNTER — LAB VISIT (OUTPATIENT)
Dept: LAB | Facility: HOSPITAL | Age: 65
End: 2025-02-27
Attending: FAMILY MEDICINE
Payer: COMMERCIAL

## 2025-02-27 DIAGNOSIS — Z00.00 ROUTINE GENERAL MEDICAL EXAMINATION AT A HEALTH CARE FACILITY: Primary | ICD-10-CM

## 2025-02-27 DIAGNOSIS — I10 ESSENTIAL HYPERTENSION, MALIGNANT: ICD-10-CM

## 2025-02-27 DIAGNOSIS — E11.9 DIABETES MELLITUS WITHOUT COMPLICATION: ICD-10-CM

## 2025-02-27 LAB
ALBUMIN SERPL-MCNC: 3.7 G/DL (ref 3.4–4.8)
ALP SERPL-CCNC: 82 UNIT/L (ref 40–150)
ALT SERPL-CCNC: 10 UNIT/L (ref 0–55)
ANION GAP SERPL CALC-SCNC: 6 MEQ/L
AST SERPL-CCNC: 15 UNIT/L (ref 5–34)
BASOPHILS # BLD AUTO: 0.03 X10(3)/MCL
BASOPHILS NFR BLD AUTO: 0.3 %
BILIRUB DIRECT SERPL-MCNC: 0.2 MG/DL (ref 0–?)
BILIRUB INDIRECT SERPL-MCNC: 0.3 MG/DL (ref 0–0.8)
BILIRUB SERPL-MCNC: 0.5 MG/DL
BUN SERPL-MCNC: 16.8 MG/DL (ref 8.4–25.7)
CALCIUM SERPL-MCNC: 9.5 MG/DL (ref 8.8–10)
CHLORIDE SERPL-SCNC: 107 MMOL/L (ref 98–107)
CHOLEST SERPL-MCNC: 192 MG/DL
CHOLEST/HDLC SERPL: 4 {RATIO} (ref 0–5)
CO2 SERPL-SCNC: 28 MMOL/L (ref 23–31)
CREAT SERPL-MCNC: 0.94 MG/DL (ref 0.72–1.25)
CREAT/UREA NIT SERPL: 18
EOSINOPHIL # BLD AUTO: 0.21 X10(3)/MCL (ref 0–0.9)
EOSINOPHIL NFR BLD AUTO: 2.3 %
ERYTHROCYTE [DISTWIDTH] IN BLOOD BY AUTOMATED COUNT: 15.4 % (ref 11.5–17)
EST. AVERAGE GLUCOSE BLD GHB EST-MCNC: 111.2 MG/DL
GFR SERPLBLD CREATININE-BSD FMLA CKD-EPI: >60 ML/MIN/1.73/M2
GLUCOSE SERPL-MCNC: 91 MG/DL (ref 82–115)
HBA1C MFR BLD: 5.5 %
HCT VFR BLD AUTO: 44.6 % (ref 42–52)
HDLC SERPL-MCNC: 44 MG/DL (ref 35–60)
HGB BLD-MCNC: 13.1 G/DL (ref 14–18)
IMM GRANULOCYTES # BLD AUTO: 0.01 X10(3)/MCL (ref 0–0.04)
IMM GRANULOCYTES NFR BLD AUTO: 0.1 %
LDLC SERPL CALC-MCNC: 122 MG/DL (ref 50–140)
LYMPHOCYTES # BLD AUTO: 2.59 X10(3)/MCL (ref 0.6–4.6)
LYMPHOCYTES NFR BLD AUTO: 28.7 %
MCH RBC QN AUTO: 22.4 PG (ref 27–31)
MCHC RBC AUTO-ENTMCNC: 29.4 G/DL (ref 33–36)
MCV RBC AUTO: 76.4 FL (ref 80–94)
MONOCYTES # BLD AUTO: 0.84 X10(3)/MCL (ref 0.1–1.3)
MONOCYTES NFR BLD AUTO: 9.3 %
NEUTROPHILS # BLD AUTO: 5.33 X10(3)/MCL (ref 2.1–9.2)
NEUTROPHILS NFR BLD AUTO: 59.3 %
PLATELET # BLD AUTO: 294 X10(3)/MCL (ref 130–400)
PMV BLD AUTO: 8.8 FL (ref 7.4–10.4)
POTASSIUM SERPL-SCNC: 4.6 MMOL/L (ref 3.5–5.1)
PROT SERPL-MCNC: 7.5 GM/DL (ref 5.8–7.6)
PSA SERPL-MCNC: 0.17 NG/ML
RBC # BLD AUTO: 5.84 X10(6)/MCL (ref 4.7–6.1)
SODIUM SERPL-SCNC: 141 MMOL/L (ref 136–145)
T4 FREE SERPL-MCNC: 1.08 NG/DL (ref 0.7–1.48)
TRIGL SERPL-MCNC: 132 MG/DL (ref 34–140)
TSH SERPL-ACNC: 1.65 UIU/ML (ref 0.35–4.94)
VLDLC SERPL CALC-MCNC: 26 MG/DL
WBC # BLD AUTO: 9.01 X10(3)/MCL (ref 4.5–11.5)

## 2025-02-27 PROCEDURE — 80048 BASIC METABOLIC PNL TOTAL CA: CPT

## 2025-02-27 PROCEDURE — 85025 COMPLETE CBC W/AUTO DIFF WBC: CPT

## 2025-02-27 PROCEDURE — 84439 ASSAY OF FREE THYROXINE: CPT

## 2025-02-27 PROCEDURE — 36415 COLL VENOUS BLD VENIPUNCTURE: CPT

## 2025-02-27 PROCEDURE — 84443 ASSAY THYROID STIM HORMONE: CPT

## 2025-02-27 PROCEDURE — 80076 HEPATIC FUNCTION PANEL: CPT

## 2025-02-27 PROCEDURE — 83036 HEMOGLOBIN GLYCOSYLATED A1C: CPT

## 2025-02-27 PROCEDURE — 84153 ASSAY OF PSA TOTAL: CPT

## 2025-02-27 PROCEDURE — 80061 LIPID PANEL: CPT

## 2025-05-06 DIAGNOSIS — M51.9 LUMBAR DISC DISEASE: Primary | ICD-10-CM

## 2025-05-06 DIAGNOSIS — M47.816 LUMBAR SPONDYLOSIS: ICD-10-CM

## 2025-05-06 DIAGNOSIS — M54.32 LEFT SIDED SCIATICA: ICD-10-CM

## 2025-05-15 ENCOUNTER — TELEPHONE (OUTPATIENT)
Facility: CLINIC | Age: 65
End: 2025-05-15
Payer: COMMERCIAL

## 2025-05-15 NOTE — TELEPHONE ENCOUNTER
Patient is being referred to Dr. Jem Walter and is scheduled on 05/19/2025 at 9:00 am .    Patient Information:  Referred by: Dr. Jake Moralez JR.  Reason for referral: M51.9 (ICD-10-CM) - Lumbar disc disease, M47.816 (ICD-10-CM) - Lumbar spondylosis     Pain Assessment:  Location(s) of pain: Left Buttocks that radiates down the back of the left leg to the left knee  Pain duration: [How long has the patient been experiencing pain?]: 1 year(s)  Is this accident or work related injury? Not pertinent  Is there an  involved? Not pertinent    Medication Management:  Medication management for Pain provided patient No Pain medication  Diabetes Mellitus Management (check for insulin use): Not pertinent  Opioid use: Patient has no current use of opiates. Previous use of opiates: no  Current use of blood thinning medication: not on any antiplatelet agents and not on any anticoagulants  PCP: Dr. Jake Moralez  Cardiologist: Chris Campoverde MD      Non-Medication Management:  Non-Medication Measures  Patient has previously attempted the following non-medication conservative measures Heat pad for his current presentation with no benefit from Heat pad    Interventional Procedures  Injections: Not pertinent  Surgery of the area of pain: Not pertinent  Other: Not pertinent    Additional History:  Previous multiple ER visits related to pain: Not pertinent  Imaging in last 3 years of the anatomic region: MRI of the Lumbar Spine/Low Back dated 3/17/2025 with report available in epic imaging section and Xray of the Lumbar Spine/Low Back dated 7/30/2024 with report available in epic imaging section      I had an extensive discussion with the patient with respect to what to expect on the day of clinical visit:  Arrival 30 minutes prior to her scheduled visit to allow reasonable time for check in, pain forms to fill prior to the visit, assessment by the pain staff prior to the visit with Dr. Walter.  History,  Physical exam and a detailed discussion of possible pain generators for the patient.  For management strategies, Dr. Walter practices multimodal management of chronic pain which includes non interventional and nonmedicated measures including physical therapy, occupational therapy, nutritional change, pain psychology.  With respect to medication management Dr. Walter does not typically offer or take over opioids prescribed by other physicians and would need further in-person evaluation to make that decision.  Additionally Dr. Walter offers interventional strategies that include injections for nerve pain, arthritic pain of spine and joints of the body, disc injections, procedures to address bone related pain, compression fractures of spine and advanced procedures including spinal cord stimulators.  As a chronic pain team our goal is to work on improving your functionality first that is your ability to perform things that you would generally enjoy and also improve your pain along the process. Pain alleviation can sometimes longer than functional improvement.   All questions and concerns were addressed at this telephonic visit with the patient.  Patient confirmed the appointment.

## 2025-05-19 ENCOUNTER — OFFICE VISIT (OUTPATIENT)
Facility: CLINIC | Age: 65
End: 2025-05-19
Payer: COMMERCIAL

## 2025-05-19 VITALS
HEART RATE: 81 BPM | WEIGHT: 315 LBS | SYSTOLIC BLOOD PRESSURE: 93 MMHG | HEIGHT: 74 IN | DIASTOLIC BLOOD PRESSURE: 45 MMHG | BODY MASS INDEX: 40.43 KG/M2

## 2025-05-19 DIAGNOSIS — E66.01 MORBID OBESITY WITH BMI OF 50.0-59.9, ADULT: Primary | ICD-10-CM

## 2025-05-19 DIAGNOSIS — M51.9 LUMBAR DISC DISEASE: ICD-10-CM

## 2025-05-19 DIAGNOSIS — M54.16 LUMBAR RADICULOPATHY, CHRONIC: ICD-10-CM

## 2025-05-19 DIAGNOSIS — M47.816 FACET ARTHRITIS OF LUMBAR REGION: Chronic | ICD-10-CM

## 2025-05-19 PROCEDURE — 1159F MED LIST DOCD IN RCRD: CPT | Mod: CPTII,,, | Performed by: STUDENT IN AN ORGANIZED HEALTH CARE EDUCATION/TRAINING PROGRAM

## 2025-05-19 PROCEDURE — 3008F BODY MASS INDEX DOCD: CPT | Mod: CPTII,,, | Performed by: STUDENT IN AN ORGANIZED HEALTH CARE EDUCATION/TRAINING PROGRAM

## 2025-05-19 PROCEDURE — 99204 OFFICE O/P NEW MOD 45 MIN: CPT | Mod: ,,, | Performed by: STUDENT IN AN ORGANIZED HEALTH CARE EDUCATION/TRAINING PROGRAM

## 2025-05-19 PROCEDURE — 3074F SYST BP LT 130 MM HG: CPT | Mod: CPTII,,, | Performed by: STUDENT IN AN ORGANIZED HEALTH CARE EDUCATION/TRAINING PROGRAM

## 2025-05-19 PROCEDURE — 3044F HG A1C LEVEL LT 7.0%: CPT | Mod: CPTII,,, | Performed by: STUDENT IN AN ORGANIZED HEALTH CARE EDUCATION/TRAINING PROGRAM

## 2025-05-19 PROCEDURE — 3078F DIAST BP <80 MM HG: CPT | Mod: CPTII,,, | Performed by: STUDENT IN AN ORGANIZED HEALTH CARE EDUCATION/TRAINING PROGRAM

## 2025-05-19 RX ORDER — PREGABALIN 50 MG/1
CAPSULE ORAL
Qty: 85 CAPSULE | Refills: 0 | Status: SHIPPED | OUTPATIENT
Start: 2025-05-19 | End: 2025-06-18

## 2025-05-19 RX ORDER — TIRZEPATIDE 15 MG/.5ML
INJECTION, SOLUTION SUBCUTANEOUS
COMMUNITY
Start: 2025-05-08

## 2025-05-19 NOTE — PROGRESS NOTES
Jem aWlter M.D.   [facility]  Interventional Pain    New Patient Visit    Referring provider: Dr. Jake BARNES*    Chief Complaint   Patient presents with    Low-back Pain     Patient having lower back pain with radiation to hips, legs, and stops at ankle  Physical therapy- hasn't tried   No injections or sx in past   No pain management in past        Assessment and Plan:     Tracy Hendrix is a 65 y.o. male with low back pain. A detailed and lengthy discussion was undertaken regarding the patient's presentation including history,  physical examination, past treatments, relevant laboratory, imaging results and future management strategies.      Assessment: 65 y.o. year old male with        ICD-10-CM ICD-9-CM   1. Morbid obesity with BMI of 50.0-59.9, adult  E66.01 278.01    Z68.43 V85.43   2. Lumbar radiculopathy, chronic  M54.16 724.4   3. Facet arthritis of lumbar region  M47.816 721.3   4. Lumbar disc disease  M51.9 722.93       The mentioned diagnosis is Worsening  and is accompanied by moderate limitation to ADL's    1. Morbid obesity with BMI of 50.0-59.9, adult  -     Ambulatory referral/consult to Bariatric Surgery  -     Ambulatory referral/consult to Psychology; Future; Expected date: 05/26/2025  -     Ambulatory Referral/Consult to Physical Therapy; Future; Expected date: 05/26/2025    2. Lumbar radiculopathy, chronic  -     pregabalin (LYRICA) 50 MG capsule; Take 1 capsule (50 mg total) by mouth every evening for 7 days, THEN 1 capsule (50 mg total) 2 (two) times daily for 7 days, THEN 2 capsules (100 mg total) 2 (two) times daily for 16 days. Take 1 capsule QHS x 1 week, then increase to BID (if tolerated).  Dispense: 85 capsule; Refill: 0    3. Facet arthritis of lumbar region  -     Ambulatory referral/consult to Pain Clinic  -     Ambulatory Referral/Consult to Physical Therapy; Future; Expected date: 05/26/2025    4. Lumbar disc disease  -     Ambulatory referral/consult to  Pain Clinic         Plan:  The plan was clearly communicated to the patient, who verbalized understanding of the same.     Diagnostics: Reviewed the followed diagnostic results  Labs/EMG: Yes and pertinent for anemia with Hb 13.1  Imaging:   MRI/CT:   03/15/2025 MRI Lumbar Spine Impression: L1-2: Central and right lateral disc protrusion, 2-3 mm. L2-3: Central and left lateral disc herniation, 3-4 mm. Facet joint arthrosis.  Bilateral facet joint effusions. Left lateral recess stenosis, possible left L3 nerve root impingement. L3-4: Moderate facet joint arthrosis.  Right greater than left. Right foraminal stenosis, possible right L3 nerve root impingement. L4-5: Moderate facet joint arthrosis. Bilateral, lateral spondylosis, 2-3 mm.  Minor foraminal stenosis. L5-S1: Advanced facet joint arthrosis.  Bilateral, lateral spondylosis, 2-3 mm. Significant bilateral foraminal stenosis, possible bilateral L5 nerve root impingement.  Xray:  07/29/2024 Xray Lumbar Spine Impression: Degenerative changes with retrolisthesis at 2 levels. No acute fractures or dislocations identified  Medications:  Plan/Ordered:  Pregabalin 100 mg BID  Current: None  Previous: Meloxicam  Antiplatelets/Anticoagulants: not on any antiplatelet agents and not on any anticoagulants  PCP: Dr. Jake Moralez  Cardiologist: Chris Campoverde MD   Non-Pharmacologic  Therapy: Patient has not previously completed physician directed physical therapy for his current presentation.  We will provide a new referral for the patient at this office visit to address his current complaints.  Referral provided for aquatic aerobic the regimen to help with weight loss, balance issue, strengthening of multifidus, hip muscles at [facility]  We also discussed extensively to consider other measures including TENs unit, Dry Needling, Massage therapy, Home exercises, Heat pad, Cold/Ice Pack, Yoga, Meditation, Acupuncture/Acupressure, Nutrition change: Anti-Inflammatory  diet, Weight loss , and Pain Psychology to improve pain and functionality  New Consults:    Bariatric surgery:  Patient apprehensive to surgical intervention.  However strongly recommended to consider this route given his significant weight gain.  Recommended bariatric program for nutritional change and also regimented exercise program in future.  Psychology:  New referral provided at this visit to address stress anxiety related to work, personal life and help along the weight loss journey at this visit.  Interventions:  Injections:  Plan/Ordered:   None at this time    Previous:  Not pertinent  Information on the procedure was provided to the patient today. Risks and benefits were discussed. All questions were answered.  Surgical Spine Interventions: Not pertinent  Relevant Risk Factors for Interventions:  PMH:  has a past medical history of Hypertension.   PSH:  has no past surgical history on file.  Allergies: Not pertinent  Other:   I reviewed the prior notes from each unique source dated 04/21/2025 with Dr. Joni Mandel   Old Records: Decision was made to not obtain old records   Follow Up: Plan for patient to follow up  6-8 weeks    Current Visit Imaging Interpretation:  I independently visualized/interpreted the following images dated 03/15/2025 MRI Lumbar Spine    Pertinent findings include: L1-2: Central and right lateral disc protrusion, 2-3 mm. L2-3: Central and left lateral disc herniation, 3-4 mm. Facet joint arthrosis.  Bilateral facet joint effusions. Left lateral recess stenosis, possible left L3 nerve root impingement. L3-4: Moderate facet joint arthrosis.  Right greater than left. Right foraminal stenosis, possible right L3 nerve root impingement. L4-5: Moderate facet joint arthrosis. Bilateral, lateral spondylosis, 2-3 mm.  Minor foraminal stenosis. L5-S1: Advanced facet joint arthrosis.  Bilateral, lateral spondylosis, 2-3 mm. Significant bilateral foraminal stenosis, possible bilateral L5 nerve  root impingement.  Key Images:       The above plan and management options were discussed at length with patient. Patient is in agreement with the above and verbalized understanding.    - I discussed the goals of interventional chronic pain management with the patient on today's visit. I explained the utility of injections for diagnostic and therapeutic purposes.  We discussed a multimodal approach to pain including treating the patient's given worst pain at any given time.  We will use a systematic approach to addressing pain.  We will also adopt a multimodal approach that includes injections, adjuvant medications, physical therapy, at times psychiatry.  There may be a limited role for opioid use intermittently in the treatment of pain, more particularly for acute pain although no one approach can be used as a sole treatment modality. I emphasized the importance of regular exercise, core strengthening and stretching, diet and weight loss as a cornerstone of long-term pain management.    - This condition does not require this patient to take time off of work, and the primary goal of our Pain Management services is to improve the patient's functional capacity.  - Patient Questions: Answered all of the patient's questions regarding diagnoses, therapy, treatment and next steps      History of Present Illness:     HPI Summary    Pain Disability Index:      5/19/2025     9:04 AM   Last 3 PDI Scores   Pain Disability Index (PDI) 41            Pain Assessment  Onset: Gradual  Approximate duration of pain: 1-1.5 year(s)  Context/Inciting Event:Denies p/h/o trauma, falls or MVC  Progression: Gradually worsening    Location: Lumbar Spine/Low Back  Radiation: Notes radiation of pain along the following the dermatomes: L4, L5, and S1 to the Left Lower Extremity    Description/Character: Burning, Pins & Needles, and Tingling  Frequency and Timing: Daily but Intermittent/Comes and Goes and is worse in Evening and Night  time    Pain Score: Today the pain score is 0/10. Pain scores range from 0/10 - 8/10  Symptoms Worse With: Standing - 5-10 mins, Walking - 1-2 mins, Bending backward, Bending on sides, Rotation movement, and Heavy Lifting/Load bearing  Symptoms Improved With: Rest or Laying down  Functional/Activity Limitations: yes, Symptoms interfere with daily activity and work    Work Related: no  Profession: n/a    Associated Conditions:  Numbness: yes along Left Lower Extremity  Weakness: yes along Left Lower Extremity    Sleep problems: no  H/o Depression and anxiety requiring medications: no  Opiate use disorder: no  Inflammatory conditions like Rheumatoid/Ankylosing spondylitis/Psoriatic: no    Patient denies night fever/night sweats, urinary incontinence, bowel incontinence, significant weight loss, significant motor weakness, and loss of sensations      Previous Treatments:    Non-Medication Measures  Patient has previously attempted the following non-medication conservative measures Heat pad for his current presentation with no benefit from Heat pad    Medications  Current: None  Previous: Meloxicam  Antiplatelets/Anticoagulants: not on any antiplatelet agents and not on any anticoagulants  PCP: Dr. Jake Moralez  Cardiologist: Chris Campoverde MD    Interventional Procedures  Injections: Not pertinent  Surgery of the area of pain: Not pertinent  Other: Not pertinent     Review:  Reviewed and consistent with medication use as prescribed.      Review of patient's allergies indicates:  No Known Allergies    Past Medical History:   Diagnosis Date    Hypertension        History reviewed. No pertinent surgical history.     reports that he has never smoked. He does not have any smokeless tobacco history on file. He reports current alcohol use. He reports that he does not use drugs.    No family history on file.    Current Outpatient Medications   Medication Sig    amLODIPine (NORVASC) 10 MG tablet Take 10 mg by mouth  "once daily.    finasteride (PROSCAR) 5 mg tablet Take 5 mg by mouth once daily.    furosemide (LASIX) 20 MG tablet Take 20 mg by mouth 2 (two) times daily.    metoprolol succinate (TOPROL-XL) 25 MG 24 hr tablet Take 25 mg by mouth once daily.    MOUNJARO 15 mg/0.5 mL PnIj SMARTSIG:15 Milligram(s) SUB-Q Once a Week    tamsulosin (FLOMAX) 0.4 mg Cap Take 0.4 mg by mouth once daily.    pregabalin (LYRICA) 50 MG capsule Take 1 capsule (50 mg total) by mouth every evening for 7 days, THEN 1 capsule (50 mg total) 2 (two) times daily for 7 days, THEN 2 capsules (100 mg total) 2 (two) times daily for 16 days. Take 1 capsule QHS x 1 week, then increase to BID (if tolerated).    triamcinolone acetonide 0.1% (KENALOG) 0.1 % cream Apply topically 3 (three) times daily. (Patient not taking: Reported on 5/19/2025)     No current facility-administered medications for this visit.       Review of Systems:     Review of Systems   Constitutional: Negative.  Negative for chills, diaphoresis and fever.   HENT: Negative.     Eyes: Negative.    Respiratory: Negative.  Negative for shortness of breath and wheezing.    Cardiovascular: Negative.  Negative for chest pain and palpitations.   Gastrointestinal: Negative.  Negative for blood in stool.   Genitourinary: Negative.    Musculoskeletal:         Refer to HPI   Skin: Negative.  Negative for rash.   Neurological:  Negative for tremors and speech change.   Endo/Heme/Allergies: Negative.    Psychiatric/Behavioral: Negative.         PHYSICAL EXAM:   Visit Vitals  BP (!) 93/45 (BP Location: Left arm, Patient Position: Sitting)   Pulse 81   Ht 6' 2" (1.88 m)   Wt (!) 192.8 kg (425 lb)   BMI 54.57 kg/m²       General Appearance: healthy, well developed, well nourished, appropriately dressed  Mental Status: Alert, oriented, thought content appropriate; Normal affect  Psych: Mood and affect appropriate  Head: Normocephalic, atraumatic  Eyes: Extraocular movements intact.   Neck: No asymmetry, " masses or scars; supple; midline   Skin: Warm and dry; No rashes visible on exposed areas  Lung: Respiratory effort normal, symmetrical chest rise  Cardiovascular: RRR with palpation of the radial artery.    Neuro:  Motor & Sensory:     Strength and Sensation:     Nerve L2 L3 L4/L5 L5 S1 S2   Muscle Group Hip Flexion Hip  Adduction Knee Extension Ankle Dorsiflexion Toe  Dorsiflexion Hip  Abduction Ankle Plantarflexion Toe  Plantarflexion   Right 5/5 5/5 5/5 5/5 5/5 5/5 5/5 5/5   Left 5/5 5/5 5/5 5/5 5/5 5/5 5/5 5/5     Nerve Root (area) Light Touch    R L   L3 (anterior thigh/medial knee) 2 2   L4 (lateral thigh/ant. knee/medial foot border) 2 2   L5 (lateral leg/dorsum foot below 2-4 toes) 2 2   S1 (lateral foot border) 2 2   S2 (plantar heel) 2 2     Reflexes:    R L   Patellar (L4) 2+ 2+   Medial Hamstring (L5) 2+ 2+   Achilles (S1) 2+ 2+       Upper Tract Signs:    R L   Kirk's Neg for flexion of thumb & Index finger Neg for flexion of thumb & Index finger   Plantar Response Down-going Down-going   Clonus Neg at ankle Neg at ankle     Gait:   Heel walking (L4/5 - anterior tibialis): Absent  Toe walking (S1/2 - gastrocnemius/soleus): Absent  Tandem walking (Balance/coordination): Absent      MSK:    Lumbar & SIJ Exam  Inspection:   Skin: Negative for scars, signs of infection/inflammation, masses  Contour: Lumbar Lordosis (anterior curvature); Negative for Kyphosis and Scoliosis (on bending forward)      Palpation:  Spinous Process: No TTP   Paraspinal Muscles: TTP along low lumbar spine    ROM:   Mildly Limited and Moderately Limited with mild pain and moderate pain for Flexion, Extension, Rotation, and Lateral Bending    Special Tests  Facet Loading: Positive bilaterally  Straight Leg Raise (L5-S1): Positive on Left side  Slump Test (L4-S1): Positive on Left side  Femoral Stretch (L2-4): Negative bilaterally    SI Joint Exam R L   Shelley Finger + +   Gaenslen's Test + +   BUNNY Test + +       Data      Imaging of Relevance:     Lumbar Spine  MRI Imaging  Results for orders placed in visit on 03/15/25    MRI Lumbar Spine Without Contrast    Narrative  EXAMINATION:  MRI LUMBAR SPINE    CLINICAL HISTORY:  Lumbago with sciatica, unspecified side    TECHNIQUE:  Standard sequences applied in 2 planes.    COMPARISON:  07/29/2024, three views of the lumbar spine demonstrating hypertrophic spur formation and minor disc space narrowing at all levels.    FINDINGS:  OSSEOUS STRUCTURES: Vertebral column is normal for alignment. No congenital anomalies. Normal bone marrow signal intensity.    PARASPINOUS STRUCTURES: Normal paravertebral musculature. Great vessels are normal. No evidence of any intra-abdominal mass lesion or abnormal lymphadenopathy.    INTRADURAL STRUCTURES: Conus medullaris terminates in its normal position. No intrathecal mass lesion or abnormal inflammatory processes.    DISC SPACES:    L1-2: Positive for a central and right lateral broad-based disc protrusion: Disc material intrudes into the central and right lateral spinal canal 2-3 mm.    Normal facet joints.    No evidence of significant stenosis to the central spinal canal or the lateral recesses.    No evidence of foraminal stenosis.    L2-3: Positive for a central and left lateral broad-based disc herniation: Extruded disc fragment intrudes into the central and left lateral spinal canal 3-4 mm.    Positive for a left lateral extension of that herniation into the left neural foramina.    Positive for significant facet joint arthrosis: Hypertrophy of the facets and ligamentum flavum.    Bilateral facet joint effusions.    Positive for subsequent left lateral recess stenosis secondary to the disc herniation and facet arthrosis: Possible left L3 nerve root impingement.    No significant stenosis to the central spinal canal or the right lateral recess.    No significant foraminal stenosis or peripheral nerve root impingement.    L3-4:    Positive for  moderate facet joint arthrosis. Noticeable hypertrophy of the facets and ligamentum flavum with erosions of the facet surfaces.  More prominent on the right than the left.    Positive for significant right foraminal stenosis with possible right L3 nerve root impingement    No evidence of disc protrusion or herniation.    No significant stenosis to the central spinal canal or lateral recesses.    No evidence of left foraminal stenosis.    L4-5:    Positive for moderate facet joint arthrosis. Noticeable hypertrophy of the facets and ligamentum flavum with erosions of the facet surfaces.    Positive for a bilateral lateral spondylosis: 2-3 mm osteophytes from the disc space encroach into the inferior aspect of each neural foramina creating mild to moderate bilateral foraminal stenosis.    However, foraminal volume remains adequate.    No evidence of central disc protrusion or herniation.    No significant stenosis to the central spinal canal or lateral recesses.    L5-S1:    Positive for advanced facet joint arthrosis. Prominent hypertrophy of the facets and ligamentum flavum with erosions of those articulating surfaces.    Positive for a bilateral, lateral spondylosis: A 2-3 mm osteophyte encroaches into each neural foramina.    Positive for significant foraminal stenosis secondary to the spondylosis and facet arthrosis with possible bilateral L5 nerve root impingement.    No evidence of central disc protrusion or herniation.    No evidence of stenosis of the central spinal canal or lateral recesses.    Impression  L1-2: Central and right lateral disc protrusion, 2-3 mm. L2-3: Central and left lateral disc herniation, 3-4 mm. Facet joint arthrosis.  Bilateral facet joint effusions. Left lateral recess stenosis, possible left L3 nerve root impingement. L3-4: Moderate facet joint arthrosis.  Right greater than left. Right foraminal stenosis, possible right L3 nerve root impingement. L4-5: Moderate facet joint arthrosis.  Bilateral, lateral spondylosis, 2-3 mm.  Minor foraminal stenosis. L5-S1: Advanced facet joint arthrosis.  Bilateral, lateral spondylosis, 2-3 mm. Significant bilateral foraminal stenosis, possible bilateral L5 nerve root impingement.      Electronically signed by: Juventino Yuen  Date:    03/17/2025  Time:    16:30        CT Imaging  No results found for this or any previous visit.      Xray Imaging    Results for orders placed during the hospital encounter of 07/29/24    X-Ray Lumbar Spine 2 Or 3 Views    Narrative  EXAMINATION:  XR LUMBAR SPINE 2 OR 3 VIEWS    CPT: 61290    CLINICAL HISTORY:  Sciatica, unspecified side    FINDINGS:  There are 5 non rib-bearing vertebral bodies with a slight levoscoliosis with maximum curvature of the level of L3 vertebral bodies are normal height with evidence of retrolisthesis of L3 on L4 as well as retrolisthesis of L4 on L5.    Degenerative changes identified with marginal osteophytes at multiple levels as well as degenerative changes of the posterior elements at L4-L5 and L5-S1.    No acute fractures or dislocations identified    Impression  Degenerative changes with retrolisthesis at 2 levels. No acute fractures or dislocations identified      Electronically signed by: Farhan Lucas  Date:    07/30/2024  Time:    15:42        Labs of Relevance:  Chemistry:  Lab Results   Component Value Date     02/27/2025    K 4.6 02/27/2025    BUN 16.8 02/27/2025    CREATININE 0.94 02/27/2025    EGFRNORACEVR >60 02/27/2025    CALCIUM 9.5 02/27/2025    ALKPHOS 82 02/27/2025    ALBUMIN 3.7 02/27/2025    BILIDIR 0.2 02/27/2025    IBILI 0.30 02/27/2025    AST 15 02/27/2025    ALT 10 02/27/2025    MG 2.20 08/23/2020    PHOS 3.6 07/08/2020        Lab Results   Component Value Date    HGBA1C 5.5 02/27/2025    HGBA1C 6.2 04/08/2024        Hematology:  Lab Results   Component Value Date    WBC 9.01 02/27/2025    HGB 13.1 (L) 02/27/2025    HCT 44.6 02/27/2025     02/27/2025    No  "results found for: "INR", "PROTIME"      Jem Walter MD  Interventional Pain Management  [facility]     Disclaimer:  This note was prepared using voice recognition system and is likely to have sound alike errors that may have been overlooked even after proof reading.  Please call me with any questions    "

## 2025-05-20 ENCOUNTER — PATIENT MESSAGE (OUTPATIENT)
Dept: PSYCHIATRY | Facility: CLINIC | Age: 65
End: 2025-05-20
Payer: COMMERCIAL

## 2025-05-21 ENCOUNTER — PATIENT MESSAGE (OUTPATIENT)
Dept: BARIATRICS | Facility: HOSPITAL | Age: 65
End: 2025-05-21
Payer: COMMERCIAL

## 2025-06-29 ENCOUNTER — ANESTHESIA EVENT (OUTPATIENT)
Dept: ENDOSCOPY | Facility: HOSPITAL | Age: 65
End: 2025-06-29
Payer: COMMERCIAL

## 2025-06-29 NOTE — ANESTHESIA PREPROCEDURE EVALUATION
06/29/2025  Tracy Hendrix is a 65 y.o., male with   -------------------------------------    Hypertension    Sleep apnea   Super morbid obesity with BMI of 54    And No past surgical history on file.    Presents for screening colonoscopy   Previous colonoscopy about 13 years ago    +CPAP machine at night - settings unknown  No previous anesthetic issues          Pre-op Assessment    I have reviewed the NPO Status.      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     Cardiovascular:     Hypertension                                    Hypertension         Pulmonary:        Sleep Apnea     Obstructive Sleep Apnea (GUILLERMO).           Neurological:    Neuromuscular Disease,                                 Neuromuscular Disease       Physical Exam  General: Well nourished, Cooperative, Alert and Oriented    Airway:  Mallampati: III   Mouth Opening: Normal  TM Distance: Normal  Tongue: Normal  Neck ROM: Normal ROM  Neck: Girth Increased  Thick neck with redundant tissue  Dental:  Intact    Chest/Lungs:  Clear to auscultation, Normal Respiratory Rate    Heart:  Rate: Normal  Rhythm: Regular Rhythm        Anesthesia Plan  Type of Anesthesia, risks & benefits discussed:    Anesthesia Type: Gen Natural Airway  Intra-op Monitoring Plan: Standard ASA Monitors  Post Op Pain Control Plan: IV/PO Opioids PRN  Induction:  IV  Airway Plan: Direct  Informed Consent: Informed consent signed with the Patient and all parties understand the risks and agree with anesthesia plan.  All questions answered. Patient consented to blood products? No  ASA Score: 3  Day of Surgery Review of History & Physical: H&P Update referred to the surgeon/provider.  Anesthesia Plan Notes: Nasal cannula vs facemask supplemental oxygenation   For patients with GUILLERMO/obesity, may consider SuperNoval Nasal  CPAP      Ready For Surgery From Anesthesia Perspective.     .

## 2025-06-30 ENCOUNTER — HOSPITAL ENCOUNTER (OUTPATIENT)
Facility: HOSPITAL | Age: 65
Discharge: HOME | End: 2025-06-30
Attending: INTERNAL MEDICINE | Admitting: INTERNAL MEDICINE
Payer: COMMERCIAL

## 2025-06-30 ENCOUNTER — ANESTHESIA (OUTPATIENT)
Dept: ENDOSCOPY | Facility: HOSPITAL | Age: 65
End: 2025-06-30
Payer: COMMERCIAL

## 2025-06-30 VITALS
OXYGEN SATURATION: 98 % | DIASTOLIC BLOOD PRESSURE: 76 MMHG | TEMPERATURE: 97 F | RESPIRATION RATE: 15 BRPM | BODY MASS INDEX: 40.43 KG/M2 | HEART RATE: 69 BPM | HEIGHT: 74 IN | WEIGHT: 315 LBS | SYSTOLIC BLOOD PRESSURE: 128 MMHG

## 2025-06-30 DIAGNOSIS — Z12.11 SCREEN FOR COLON CANCER: Primary | ICD-10-CM

## 2025-06-30 PROCEDURE — 63600175 PHARM REV CODE 636 W HCPCS: Performed by: ANESTHESIOLOGY

## 2025-06-30 PROCEDURE — G0121 COLON CA SCRN NOT HI RSK IND: HCPCS | Performed by: INTERNAL MEDICINE

## 2025-06-30 PROCEDURE — 37000009 HC ANESTHESIA EA ADD 15 MINS: Performed by: INTERNAL MEDICINE

## 2025-06-30 PROCEDURE — 37000008 HC ANESTHESIA 1ST 15 MINUTES: Performed by: INTERNAL MEDICINE

## 2025-06-30 PROCEDURE — 25000003 PHARM REV CODE 250: Performed by: ANESTHESIOLOGY

## 2025-06-30 RX ORDER — ONDANSETRON HYDROCHLORIDE 2 MG/ML
4 INJECTION, SOLUTION INTRAVENOUS DAILY PRN
Status: DISCONTINUED | OUTPATIENT
Start: 2025-06-30 | End: 2025-06-30 | Stop reason: HOSPADM

## 2025-06-30 RX ORDER — PROPOFOL 10 MG/ML
VIAL (ML) INTRAVENOUS
Status: DISCONTINUED | OUTPATIENT
Start: 2025-06-30 | End: 2025-06-30

## 2025-06-30 RX ORDER — SODIUM CHLORIDE, SODIUM GLUCONATE, SODIUM ACETATE, POTASSIUM CHLORIDE AND MAGNESIUM CHLORIDE 30; 37; 368; 526; 502 MG/100ML; MG/100ML; MG/100ML; MG/100ML; MG/100ML
INJECTION, SOLUTION INTRAVENOUS CONTINUOUS
Status: DISCONTINUED | OUTPATIENT
Start: 2025-06-30 | End: 2025-06-30 | Stop reason: HOSPADM

## 2025-06-30 RX ORDER — ASPIRIN 81 MG/1
81 TABLET ORAL DAILY
COMMUNITY

## 2025-06-30 RX ORDER — SODIUM CHLORIDE, SODIUM LACTATE, POTASSIUM CHLORIDE, CALCIUM CHLORIDE 600; 310; 30; 20 MG/100ML; MG/100ML; MG/100ML; MG/100ML
INJECTION, SOLUTION INTRAVENOUS CONTINUOUS
Status: DISCONTINUED | OUTPATIENT
Start: 2025-06-30 | End: 2025-06-30 | Stop reason: HOSPADM

## 2025-06-30 RX ORDER — LIDOCAINE HYDROCHLORIDE 20 MG/ML
INJECTION INTRAVENOUS
Status: DISCONTINUED | OUTPATIENT
Start: 2025-06-30 | End: 2025-06-30

## 2025-06-30 RX ADMIN — PROPOFOL 100 MG: 10 INJECTION, EMULSION INTRAVENOUS at 08:06

## 2025-06-30 RX ADMIN — SODIUM CHLORIDE, SODIUM GLUCONATE, SODIUM ACETATE, POTASSIUM CHLORIDE AND MAGNESIUM CHLORIDE: 526; 502; 368; 37; 30 INJECTION, SOLUTION INTRAVENOUS at 08:06

## 2025-06-30 RX ADMIN — LIDOCAINE HYDROCHLORIDE 80 MG: 20 INJECTION INTRAVENOUS at 08:06

## 2025-06-30 RX ADMIN — PROPOFOL 100 MCG/KG/MIN: 10 INJECTION, EMULSION INTRAVENOUS at 08:06

## 2025-06-30 NOTE — TRANSFER OF CARE
"Anesthesia Transfer of Care Note    Patient: Tracy Hendrix    Procedure(s) Performed: Procedure(s) (LRB):  COLON (N/A)    Patient location: GI    Anesthesia Type: general    Transport from OR: Transported from OR on room air with adequate spontaneous ventilation    Post pain: adequate analgesia    Post assessment: no apparent anesthetic complications and tolerated procedure well    Post vital signs: stable    Level of consciousness: awake, alert and oriented    Nausea/Vomiting: no nausea/vomiting    Complications: none    Transfer of care protocol was followed      Last vitals: Visit Vitals  /64   Pulse 78   Temp 36 °C (96.8 °F)   Resp 16   Ht 6' 2" (1.88 m)   Wt (!) 188.2 kg (415 lb)   SpO2 96%   BMI 53.28 kg/m²     "

## 2025-06-30 NOTE — H&P
[facility]  Gastroenterology  H&P    Patient Name: Tracy Hendrix  MRN: [id number]  Admission Date: 6/30/2025  Code Status: No Order    Attending Provider: Toi Patel MD   Primary Care Physician: Jake Moralez Jr., MD  Principal Problem:<principal problem not specified>    Subjective:     History of Present Illness:  65-year-old white man from [place] referred for screening colonoscopy.  He denies any melena, hematochezia or change in bowel functions.  He denies any family history of colon cancer.  He states he had a colonoscopy in [place] approximately 13 years ago that was normal.  He owns a restaurant.    Past Medical History:   Diagnosis Date    Hypertension     Sleep apnea        History reviewed. No pertinent surgical history.    Review of patient's allergies indicates:  No Known Allergies  Family History    None       Tobacco Use    Smoking status: Never    Smokeless tobacco: Never   Vaping Use    Vaping status: Never Used   Substance and Sexual Activity    Alcohol use: Not Currently    Drug use: Never    Sexual activity: Not on file     Review of Systems  Objective:     Vital Signs (Most Recent):  Temp: 97 °F (36.1 °C) (06/30/25 0737)  Pulse: 79 (06/30/25 0737)  Resp: 13 (06/30/25 0737)  BP: (!) 151/79 (06/30/25 0737)  SpO2: 95 % (Room air) (06/30/25 0737) Vital Signs (24h Range):  Temp:  [97 °F (36.1 °C)] 97 °F (36.1 °C)  Pulse:  [79] 79  Resp:  [13] 13  SpO2:  [95 %] 95 %  BP: (151)/(79) 151/79     Weight: (!) 188.2 kg (415 lb) (06/30/25 0737)  Body mass index is 53.28 kg/m².    No intake or output data in the 24 hours ending 06/30/25 0820    Lines/Drains/Airways       Peripheral Intravenous Line  Duration             Peripheral IV 06/30/25 0740 20 G Anterior;Right Forearm <1 day                    Physical Exam  Vitals and nursing note reviewed.   Constitutional:       General: He is not in acute distress.     Appearance: Normal appearance. He is obese.  He is not ill-appearing.   HENT:      Head: Normocephalic and atraumatic.      Nose: Nose normal.      Mouth/Throat:      Mouth: Mucous membranes are moist.   Eyes:      General: No scleral icterus.     Extraocular Movements: Extraocular movements intact.      Conjunctiva/sclera: Conjunctivae normal.   Cardiovascular:      Rate and Rhythm: Normal rate and regular rhythm.      Heart sounds: Normal heart sounds. No murmur heard.  Pulmonary:      Effort: Pulmonary effort is normal. No respiratory distress.      Breath sounds: Normal breath sounds.   Abdominal:      General: Abdomen is flat. Bowel sounds are normal. There is no distension.      Palpations: Abdomen is soft. There is no mass.      Tenderness: There is no abdominal tenderness. There is no guarding or rebound.      Hernia: No hernia is present.   Musculoskeletal:         General: Normal range of motion.      Right lower leg: No edema.      Left lower leg: No edema.   Skin:     General: Skin is warm and dry.   Neurological:      General: No focal deficit present.      Mental Status: He is alert and oriented to person, place, and time.   Psychiatric:         Mood and Affect: Mood normal.         Behavior: Behavior normal.         Thought Content: Thought content normal.         Significant Labs:  None    Significant Imaging:       Assessment/Plan:     There are no hospital problems to display for this patient.      Impression:  Average risk for colon polyps and colon cancer     Recommendation: We will proceed with screening colonoscopy    Toi Patel MD  Gastroenterology  [facility]

## 2025-06-30 NOTE — PROVATION PATIENT INSTRUCTIONS
Discharge Summary/Instructions after an Endoscopic Procedure  Patient Name: Tracy Hendrix  Patient MRN: [id number]  Patient YOB: 1960 Monday, June 30, 2025  Toi Patel MD  Dear patient,  As a result of recent federal legislation (The Federal Cures Act), you may   receive lab or pathology results from your procedure in your [facility]   account before your physician is able to contact you. Your physician or   their representative will relay the results to you with their   recommendations at their soonest availability.  Thank you,  RESTRICTIONS:  During your procedure today, you received medications for sedation.  These   medications may affect your judgment, balance and coordination.  Therefore,   for 24 hours, you have the following restrictions:   - DO NOT drive a car, operate machinery, make legal/financial decisions,   sign important papers or drink alcohol.    ACTIVITY:  Today: no heavy lifting, straining or running due to procedural   sedation/anesthesia.  The following day: return to full activity including work.  DIET:  Eat and drink normally unless instructed otherwise.     TREATMENT FOR COMMON SIDE EFFECTS:  - Mild abdominal pain, nausea, belching, bloating or excessive gas:  rest,   eat lightly and use a heating pad.  - Sore Throat: treat with throat lozenges and/or gargle with warm salt   water.  - Because air was used during the procedure, expelling large amounts of air   from your rectum or belching is normal.  - If a bowel prep was taken, you may not have a bowel movement for 1-3 days.    This is normal.  SYMPTOMS TO WATCH FOR AND REPORT TO YOUR PHYSICIAN:  1. Abdominal pain or bloating, other than gas cramps.  2. Chest pain.  3. Back pain.  4. Signs of infection such as: chills or fever occurring within 24 hours   after the procedure.  5. Rectal bleeding, which would show as bright red, maroon, or black stools.   (A tablespoon of blood from the rectum is not serious, especially if    hemorrhoids are present.)  6. Vomiting.  7. Weakness or dizziness.  GO DIRECTLY TO THE NEAREST EMERGENCY ROOM IF YOU HAVE ANY OF THE FOLLOWING:      Difficulty breathing              Chills and/or fever over 101 F   Persistent vomiting and/or vomiting blood   Severe abdominal pain   Severe chest pain   Black, tarry stools   Bleeding- more than one tablespoon   Any other symptom or condition that you feel may need urgent attention  Your doctor recommends these additional instructions:  If any biopsies were taken, your doctors clinic will contact you in 1 to 2   weeks with any results.  Recommendations:  - Discharge patient to home (ambulatory).   - Resume previous diet today.   - Repeat colonoscopy in 10 years for screening purposes.   - The findings and recommendations were discussed with the patient.  Impressions:  - The examined portion of the ileum was normal.   - The entire examined colon is normal.   - No specimens collected.  For questions, problems or results please call your physician - Toi Patel MD at Work:  [phone].  [facility] ED at [phone]  IF A COMPLICATION OR EMERGENCY SITUATION ARISES AND YOU ARE UNABLE TO REACH   YOUR PHYSICIAN - GO DIRECTLY TO THE EMERGENCY ROOM.  MD Toi Johnson MD  6/30/2025 9:47:16 AM  This report has been verified and signed electronically.  Dear patient,  As a result of recent federal legislation (The Federal Cures Act), you may   receive lab or pathology results from your procedure in your [facility]   account before your physician is able to contact you. Your physician or   their representative will relay the results to you with their   recommendations at their soonest availability.  Thank you,  PROVATION

## 2025-06-30 NOTE — ANESTHESIA POSTPROCEDURE EVALUATION
Anesthesia Post Evaluation    Patient: Tracy Hendrix    Procedure(s) Performed: Procedure(s) (LRB):  COLON (N/A)    Final Anesthesia Type: general      Patient location during evaluation: PACU  Patient participation: Yes- Able to Participate  Level of consciousness: awake and alert  Post-procedure vital signs: reviewed and stable  Pain management: adequate  Airway patency: patent    PONV status at discharge: No PONV  Anesthetic complications: no      Cardiovascular status: hemodynamically stable  Respiratory status: unassisted  Hydration status: euvolemic  Follow-up not needed.              Vitals Value Taken Time   /76 06/30/25 09:21   Temp 36 °C (96.8 °F) 06/30/25 09:05   Pulse 69 06/30/25 09:21   Resp 15 06/30/25 09:21   SpO2 98 % 06/30/25 09:21         No case tracking events are documented in the log.      Pain/Stevie Score: Stveie Score: 10 (6/30/2025  9:22 AM)

## (undated) DEVICE — LINER MEDI-VAC PPV FLTR 1500CC

## (undated) DEVICE — CABLE EKG DL RBBN 3 LEAD DISP

## (undated) DEVICE — MANIFOLD 4 PORT

## (undated) DEVICE — CONTAINER FORMALIN PREFILLED

## (undated) DEVICE — KIT SURGICAL COLON .25 1.1OZ

## (undated) DEVICE — TIP SUCTION YANKAUER

## (undated) DEVICE — SOL IRRI STRL WATER 1000ML

## (undated) DEVICE — SYRINGE 0.9% NACL 10MIL PREFIL